# Patient Record
(demographics unavailable — no encounter records)

---

## 2025-04-02 NOTE — HISTORY OF PRESENT ILLNESS
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 In the ED, patient with signs and symptoms of volume overload. Given IV Lasix  Since dc, pt denies cp/sob/pnd/orthopnea/gomez.  Has been adherent with all medications.  Weight has been [id number]/169lbs.  She has ITP and fu with Dr. Womack on 3/17, PLT were 148, saw cardio Dr. Greene 3/24, lisinipril dose was decreased.  Appears euvolemic.  Refusing HC/PT for no skilled needs.  TCM numbers provided for any questions/concerns.

## 2025-04-02 NOTE — PHYSICAL EXAM
[No Acute Distress] : no acute distress [Normal Sclera/Conjunctiva] : normal sclera/conjunctiva [Normal Outer Ear/Nose] : the outer ears and nose were normal in appearance [No JVD] : no jugular venous distention [No Respiratory Distress] : no respiratory distress  [Clear to Auscultation] : lungs were clear to auscultation bilaterally [Normal Rate] : normal rate  [No Edema] : there was no peripheral edema [Soft] : abdomen soft [Normal Gait] : normal gait [Coordination Grossly Intact] : coordination grossly intact [No Focal Deficits] : no focal deficits [Normal Affect] : the affect was normal [Normal Insight/Judgement] : insight and judgment were intact